# Patient Record
Sex: FEMALE | Race: WHITE | NOT HISPANIC OR LATINO | Employment: OTHER | URBAN - METROPOLITAN AREA
[De-identification: names, ages, dates, MRNs, and addresses within clinical notes are randomized per-mention and may not be internally consistent; named-entity substitution may affect disease eponyms.]

---

## 2017-07-12 ENCOUNTER — HOSPITAL ENCOUNTER (OUTPATIENT)
Dept: MAMMOGRAPHY | Facility: CLINIC | Age: 62
Discharge: HOME | End: 2017-07-12
Attending: FAMILY MEDICINE | Admitting: FAMILY MEDICINE
Payer: COMMERCIAL

## 2017-07-12 DIAGNOSIS — Z12.31 VISIT FOR SCREENING MAMMOGRAM: ICD-10-CM

## 2017-07-12 PROCEDURE — 77063 BREAST TOMOSYNTHESIS BI: CPT

## 2017-10-11 RX ORDER — LIDOCAINE 40 MG/G
CREAM TOPICAL
Status: CANCELLED | OUTPATIENT
Start: 2017-10-11

## 2017-10-16 ENCOUNTER — HOSPITAL ENCOUNTER (OUTPATIENT)
Facility: CLINIC | Age: 62
Discharge: HOME | End: 2017-10-16
Attending: COLON & RECTAL SURGERY | Admitting: COLON & RECTAL SURGERY
Payer: COMMERCIAL

## 2017-10-16 ENCOUNTER — SURGERY (OUTPATIENT)
Age: 62
End: 2017-10-16

## 2017-10-16 VITALS
DIASTOLIC BLOOD PRESSURE: 60 MMHG | BODY MASS INDEX: 18.27 KG/M2 | RESPIRATION RATE: 16 BRPM | WEIGHT: 107 LBS | HEIGHT: 64 IN | SYSTOLIC BLOOD PRESSURE: 93 MMHG | OXYGEN SATURATION: 98 %

## 2017-10-16 LAB — COLONOSCOPY: NORMAL

## 2017-10-16 PROCEDURE — 88305 TISSUE EXAM BY PATHOLOGIST: CPT | Mod: 26 | Performed by: COLON & RECTAL SURGERY

## 2017-10-16 PROCEDURE — 88305 TISSUE EXAM BY PATHOLOGIST: CPT | Performed by: COLON & RECTAL SURGERY

## 2017-10-16 PROCEDURE — 25000128 H RX IP 250 OP 636: Performed by: COLON & RECTAL SURGERY

## 2017-10-16 PROCEDURE — G0500 MOD SEDAT ENDO SERVICE >5YRS: HCPCS | Performed by: COLON & RECTAL SURGERY

## 2017-10-16 PROCEDURE — 99153 MOD SED SAME PHYS/QHP EA: CPT

## 2017-10-16 PROCEDURE — 45380 COLONOSCOPY AND BIOPSY: CPT | Performed by: COLON & RECTAL SURGERY

## 2017-10-16 RX ORDER — ONDANSETRON 2 MG/ML
4 INJECTION INTRAMUSCULAR; INTRAVENOUS
Status: DISCONTINUED | OUTPATIENT
Start: 2017-10-16 | End: 2017-10-16 | Stop reason: HOSPADM

## 2017-10-16 RX ORDER — FLUMAZENIL 0.1 MG/ML
0.2 INJECTION, SOLUTION INTRAVENOUS
Status: DISCONTINUED | OUTPATIENT
Start: 2017-10-16 | End: 2017-10-16 | Stop reason: HOSPADM

## 2017-10-16 RX ORDER — ONDANSETRON 2 MG/ML
4 INJECTION INTRAMUSCULAR; INTRAVENOUS EVERY 6 HOURS PRN
Status: DISCONTINUED | OUTPATIENT
Start: 2017-10-16 | End: 2017-10-16 | Stop reason: HOSPADM

## 2017-10-16 RX ORDER — NALOXONE HYDROCHLORIDE 0.4 MG/ML
.1-.4 INJECTION, SOLUTION INTRAMUSCULAR; INTRAVENOUS; SUBCUTANEOUS
Status: DISCONTINUED | OUTPATIENT
Start: 2017-10-16 | End: 2017-10-16 | Stop reason: HOSPADM

## 2017-10-16 RX ORDER — ONDANSETRON 4 MG/1
4 TABLET, ORALLY DISINTEGRATING ORAL EVERY 6 HOURS PRN
Status: DISCONTINUED | OUTPATIENT
Start: 2017-10-16 | End: 2017-10-16 | Stop reason: HOSPADM

## 2017-10-16 RX ORDER — FENTANYL CITRATE 50 UG/ML
INJECTION, SOLUTION INTRAMUSCULAR; INTRAVENOUS PRN
Status: DISCONTINUED | OUTPATIENT
Start: 2017-10-16 | End: 2017-10-16 | Stop reason: HOSPADM

## 2017-10-16 RX ADMIN — FENTANYL CITRATE 100 MCG: 50 INJECTION, SOLUTION INTRAMUSCULAR; INTRAVENOUS at 07:46

## 2017-10-16 RX ADMIN — MIDAZOLAM HYDROCHLORIDE 2 MG: 1 INJECTION, SOLUTION INTRAMUSCULAR; INTRAVENOUS at 07:46

## 2017-10-16 NOTE — H&P
"Pre-Endoscopy History and Physical     Destiney Fenton MRN# [id number]   YOB: 1955 Age: 62 year old     Date of Procedure: 10/16/2017  Primary care provider: Soumya Varghese  Type of Endoscopy: Colonoscopy  Reason for Procedure: screening  Type of Anesthesia Anticipated: Moderate Sedation    HPI:    Destiney is a 62 year old female who will be undergoing the above procedure.      A history and physical has been performed. The patient's medications and allergies have been reviewed. The risks and benefits of the procedure and the sedation options and risks were discussed with the patient.  All questions were answered and informed consent was obtained.      She denies a personal or family history of anesthesia complications or bleeding disorders.     Allergies   Allergen Reactions     Amoxicillin      Penicillins      Seafood [Shellfish-Derived Products] Swelling     Hives and facial swelling          No current facility-administered medications on file prior to encounter.   No current outpatient prescriptions on file prior to encounter.    Patient Active Problem List   Diagnosis   (none) - all problems resolved or deleted        No past medical history on file.     Past Surgical History:   Procedure Laterality Date     APPENDECTOMY       ORTHOPEDIC SURGERY      meniscus     UNKNOWN SUBSTANCE ID      rib resection       Social History   Substance Use Topics     Smoking status: Never Smoker     Smokeless tobacco: Never Used     Alcohol use Yes      Comment: 1/week       No family history on file.    REVIEW OF SYSTEMS:     5 point ROS negative except as noted above in HPI, including Gen., Resp., CV, GI &  system review.      PHYSICAL EXAM:   /76  Resp 16  Ht 1.626 m (5' 4\")  Wt 48.5 kg (107 lb)  SpO2 99%  BMI 18.37 kg/m2 Estimated body mass index is 18.37 kg/(m^2) as calculated from the following:    Height as of this encounter: 1.626 m (5' 4\").    Weight as of this encounter: 48.5 kg (107 lb). "   GENERAL APPEARANCE: healthy and alert  MENTAL STATUS: alert  AIRWAY EXAM: Mallampatti Class I (visualization of the soft palate, fauces, uvula, anterior and posterior pillars)  RESP: lungs clear to auscultation - no rales, rhonchi or wheezes  CV: regular rates and rhythm      IMPRESSION   ASA Class 2 - Mild systemic disease        PLAN:     Plan for colonoscopy. We discussed the risks, benefits and alternatives and the patient wished to proceed.    The above has been forwarded to the consulting provider.      Toya Valdez MD  [facility]  Phone: [phone]  Fax: [phone]  October 16, 2017

## 2017-10-17 LAB — COPATH REPORT: NORMAL

## 2018-07-16 ENCOUNTER — HOSPITAL ENCOUNTER (OUTPATIENT)
Dept: MAMMOGRAPHY | Facility: CLINIC | Age: 63
Discharge: HOME | End: 2018-07-16
Attending: FAMILY MEDICINE | Admitting: FAMILY MEDICINE
Payer: COMMERCIAL

## 2018-07-16 DIAGNOSIS — Z12.31 SCREENING MAMMOGRAM, ENCOUNTER FOR: ICD-10-CM

## 2018-07-16 PROCEDURE — 77067 SCR MAMMO BI INCL CAD: CPT

## 2019-07-18 ENCOUNTER — HOSPITAL ENCOUNTER (OUTPATIENT)
Dept: MAMMOGRAPHY | Facility: CLINIC | Age: 64
Discharge: HOME | End: 2019-07-18
Attending: FAMILY MEDICINE | Admitting: FAMILY MEDICINE
Payer: COMMERCIAL

## 2019-07-18 DIAGNOSIS — Z12.31 VISIT FOR SCREENING MAMMOGRAM: ICD-10-CM

## 2019-07-18 PROCEDURE — 77063 BREAST TOMOSYNTHESIS BI: CPT

## 2020-07-22 ENCOUNTER — HOSPITAL ENCOUNTER (OUTPATIENT)
Dept: MAMMOGRAPHY | Facility: CLINIC | Age: 65
Discharge: HOME | End: 2020-07-22
Attending: FAMILY MEDICINE | Admitting: FAMILY MEDICINE
Payer: COMMERCIAL

## 2020-07-22 DIAGNOSIS — Z12.31 VISIT FOR SCREENING MAMMOGRAM: ICD-10-CM

## 2020-07-22 PROCEDURE — 77067 SCR MAMMO BI INCL CAD: CPT

## 2021-03-21 ENCOUNTER — HEALTH MAINTENANCE LETTER (OUTPATIENT)
Age: 66
End: 2021-03-21

## 2021-07-23 ENCOUNTER — HOSPITAL ENCOUNTER (OUTPATIENT)
Dept: MAMMOGRAPHY | Facility: CLINIC | Age: 66
Discharge: HOME | End: 2021-07-23
Attending: FAMILY MEDICINE | Admitting: FAMILY MEDICINE
Payer: COMMERCIAL

## 2021-07-23 DIAGNOSIS — Z12.31 VISIT FOR SCREENING MAMMOGRAM: ICD-10-CM

## 2021-07-23 PROCEDURE — 77063 BREAST TOMOSYNTHESIS BI: CPT

## 2021-09-05 ENCOUNTER — HEALTH MAINTENANCE LETTER (OUTPATIENT)
Age: 66
End: 2021-09-05

## 2022-05-31 ENCOUNTER — NURSE TRIAGE (OUTPATIENT)
Dept: NURSING | Facility: CLINIC | Age: 67
End: 2022-05-31
Payer: COMMERCIAL

## 2022-05-31 NOTE — TELEPHONE ENCOUNTER
Pt calling with concerns about;    [facility] [place] [facility] and found ticks on her today.  One tick was hooked into her back that she didn't find until this evening while showering.  Pt states it was a larger tick.   Spot on Pt's back looks infected already, per Pt. Big round red spot.    Pt wants to have this checked and wondering if she should go in tonight.    Advised that she can wait until tomorrow and reviewed home care advise.  Pt verbalized understanding and agrees with this plan of care.    Rylie Wahl RN, Nurse Advisor 6:09 PM 5/31/2022  COVID 19 Nurse Triage Plan/Patient Instructions    Please be aware that novel coronavirus (COVID-19) may be circulating in the community. If you develop symptoms such as fever, cough, or SOB or if you have concerns about the presence of another infection including coronavirus (COVID-19), please contact your health care provider or visit [email or web address].     Disposition/Instructions    Home care recommended. Follow home care protocol based instructions.    Thank you for taking steps to prevent the spread of this virus.  o Limit your contact with others.  o Wear a simple mask to cover your cough.  o Wash your hands well and often.    Resources    [facility]: About COVID-19: [email or web address]    CDC: What to Do If You're Sick: www.cdc.gov/coronavirus/2019-ncov/about/steps-when-sick.html    CDC: Ending Home Isolation: www.cdc.gov/coronavirus/2019-ncov/hcp/disposition-in-home-patients.html     CDC: Caring for Someone: www.cdc.gov/coronavirus/2019-ncov/if-you-are-sick/care-for-someone.html     [facility]: Interim Guidance for Hospital Discharge to Home: www.health.[facility].[place]/diseases/coronavirus/hcp/hospdischarge.pdf    [facility] clinical trials (COVID-19 research studies): [email or web address]     Below are the COVID-19 hotlines at the [place] Department of [facility] ([facility]). Interpreters  are available.   o For health questions: Call 704-760-3428 or 1-768.414.1456 (7 a.m. to 7 p.m.)  o For questions about schools and childcare: Call 054-525-0483 or 1-772.340.2421 (7 a.m. to 7 p.m.)   Reason for Disposition    Tick bite with no complications    Additional Information    Negative: Sounds like a life-threatening emergency to the triager    Negative: Not a tick bite    Negative: [1] 2 to 14 days following tick bite AND [2] severe headache with fever occurs    Negative: [1] 2 to 14 days following tick bite AND [2] widespread rash with fever occurs    Negative: Patient sounds very sick or weak to the triager    Negative: [1] Fever AND [2] red area    Negative: [1] Fever AND [2] area is very tender to touch    Negative: [1] Red streak or red line AND [2] length > 2 inches (5 cm)    Negative: Can't remove live tick (after trying Care Advice)    Negative: Can't remove tick's head that was broken off in the skin (after trying Care Advice)    Negative: [1] 2 to 14 days following tick bite AND [2] fever AND [3] no rash or headache    Negative: [1] 2 to 14 days following tick bite AND [2] widespread rash or headache AND [3] no fever    Negative: [1] Red or very tender (to touch) area AND [2] started over 24 hours after the bite    Negative: Red ring or bull's-eye rash occurs at tick bite    Negative: [1] Probable deer tick AND [2] attached > 36 hours (or tick appears swollen, not flat) AND [3] occurred in an area where Lyme disease is common    Negative: [1] Scab is present AND [2] it drains pus or increases in size AND [3] not improved after applying antibiotic ointment for 2 days    Negative: [1] Scab is present AND [2] it drains pus or increases in size    Protocols used: TICK BITE-A-

## 2022-06-12 ENCOUNTER — HEALTH MAINTENANCE LETTER (OUTPATIENT)
Age: 67
End: 2022-06-12

## 2022-07-27 ENCOUNTER — HOSPITAL ENCOUNTER (OUTPATIENT)
Dept: MAMMOGRAPHY | Facility: CLINIC | Age: 67
Discharge: HOME | End: 2022-07-27
Attending: FAMILY MEDICINE | Admitting: FAMILY MEDICINE
Payer: MEDICARE

## 2022-07-27 DIAGNOSIS — Z12.31 VISIT FOR SCREENING MAMMOGRAM: ICD-10-CM

## 2022-07-27 PROCEDURE — 77067 SCR MAMMO BI INCL CAD: CPT

## 2022-10-22 ENCOUNTER — HEALTH MAINTENANCE LETTER (OUTPATIENT)
Age: 67
End: 2022-10-22

## 2022-10-28 ENCOUNTER — HOSPITAL ENCOUNTER (OUTPATIENT)
Facility: CLINIC | Age: 67
Discharge: HOME | End: 2022-10-28
Attending: COLON & RECTAL SURGERY | Admitting: COLON & RECTAL SURGERY
Payer: MEDICARE

## 2022-10-28 VITALS
DIASTOLIC BLOOD PRESSURE: 71 MMHG | OXYGEN SATURATION: 100 % | WEIGHT: 107 LBS | RESPIRATION RATE: 10 BRPM | BODY MASS INDEX: 18.27 KG/M2 | HEIGHT: 64 IN | SYSTOLIC BLOOD PRESSURE: 104 MMHG | HEART RATE: 51 BPM

## 2022-10-28 LAB — COLONOSCOPY: NORMAL

## 2022-10-28 PROCEDURE — G0500 MOD SEDAT ENDO SERVICE >5YRS: HCPCS | Performed by: COLON & RECTAL SURGERY

## 2022-10-28 PROCEDURE — G0105 COLORECTAL SCRN; HI RISK IND: HCPCS | Performed by: COLON & RECTAL SURGERY

## 2022-10-28 PROCEDURE — 45378 DIAGNOSTIC COLONOSCOPY: CPT | Performed by: COLON & RECTAL SURGERY

## 2022-10-28 PROCEDURE — 99153 MOD SED SAME PHYS/QHP EA: CPT | Performed by: COLON & RECTAL SURGERY

## 2022-10-28 PROCEDURE — 250N000011 HC RX IP 250 OP 636: Performed by: COLON & RECTAL SURGERY

## 2022-10-28 RX ORDER — NALOXONE HYDROCHLORIDE 0.4 MG/ML
0.2 INJECTION, SOLUTION INTRAMUSCULAR; INTRAVENOUS; SUBCUTANEOUS
Status: DISCONTINUED | OUTPATIENT
Start: 2022-10-28 | End: 2022-10-28 | Stop reason: HOSPADM

## 2022-10-28 RX ORDER — ONDANSETRON 4 MG/1
4 TABLET, ORALLY DISINTEGRATING ORAL EVERY 6 HOURS PRN
Status: DISCONTINUED | OUTPATIENT
Start: 2022-10-28 | End: 2022-10-28 | Stop reason: HOSPADM

## 2022-10-28 RX ORDER — PROCHLORPERAZINE MALEATE 5 MG
5 TABLET ORAL EVERY 6 HOURS PRN
Status: DISCONTINUED | OUTPATIENT
Start: 2022-10-28 | End: 2022-10-28 | Stop reason: HOSPADM

## 2022-10-28 RX ORDER — FENTANYL CITRATE 50 UG/ML
INJECTION, SOLUTION INTRAMUSCULAR; INTRAVENOUS PRN
Status: DISCONTINUED | OUTPATIENT
Start: 2022-10-28 | End: 2022-10-28 | Stop reason: HOSPADM

## 2022-10-28 RX ORDER — ONDANSETRON 2 MG/ML
4 INJECTION INTRAMUSCULAR; INTRAVENOUS
Status: DISCONTINUED | OUTPATIENT
Start: 2022-10-28 | End: 2022-10-28 | Stop reason: HOSPADM

## 2022-10-28 RX ORDER — LIDOCAINE 40 MG/G
CREAM TOPICAL
Status: DISCONTINUED | OUTPATIENT
Start: 2022-10-28 | End: 2022-10-28 | Stop reason: HOSPADM

## 2022-10-28 RX ORDER — FLUMAZENIL 0.1 MG/ML
0.2 INJECTION, SOLUTION INTRAVENOUS
Status: DISCONTINUED | OUTPATIENT
Start: 2022-10-28 | End: 2022-10-28 | Stop reason: HOSPADM

## 2022-10-28 RX ORDER — DIPHENHYDRAMINE HYDROCHLORIDE 50 MG/ML
INJECTION INTRAMUSCULAR; INTRAVENOUS PRN
Status: DISCONTINUED | OUTPATIENT
Start: 2022-10-28 | End: 2022-10-28 | Stop reason: HOSPADM

## 2022-10-28 RX ORDER — NALOXONE HYDROCHLORIDE 0.4 MG/ML
0.4 INJECTION, SOLUTION INTRAMUSCULAR; INTRAVENOUS; SUBCUTANEOUS
Status: DISCONTINUED | OUTPATIENT
Start: 2022-10-28 | End: 2022-10-28 | Stop reason: HOSPADM

## 2022-10-28 RX ORDER — ONDANSETRON 2 MG/ML
4 INJECTION INTRAMUSCULAR; INTRAVENOUS EVERY 6 HOURS PRN
Status: DISCONTINUED | OUTPATIENT
Start: 2022-10-28 | End: 2022-10-28 | Stop reason: HOSPADM

## 2022-10-28 ASSESSMENT — ACTIVITIES OF DAILY LIVING (ADL)
ADLS_ACUITY_SCORE: 33
ADLS_ACUITY_SCORE: 35

## 2022-10-28 NOTE — H&P
Pre-Endoscopy History and Physical     Destiney Fenton MRN# [id number]   YOB: 1955 Age: 67 year old     Date of Procedure: 10/28/22  Primary care provider: Soumya Varghese  Type of Endoscopy: Colonoscopy  Reason for Procedure: surveillance, TA x 1 in 2017  Type of Anesthesia Anticipated: Moderate Sedation    HPI:    Destiney is a 67 year old female who will be undergoing the above procedure.      A history and physical has been performed. The patient's medications and allergies have been reviewed. The risks and benefits of the procedure and the sedation options and risks were discussed with the patient.  All questions were answered and informed consent was obtained.      She denies a personal or family history of anesthesia complications or bleeding disorders.     Allergies   Allergen Reactions     Amoxicillin      Asparagus Hives     Facial hives     Penicillins      Seafood [Shellfish-Derived Products] Swelling     Hives and facial swelling      Allergy to Latex: NO   Allergy to tape: NO   Intolerances: n/a    No current facility-administered medications on file prior to encounter.  Multiple Vitamins-Minerals (MULTIVITAMIN ADULT PO),         Patient Active Problem List   Diagnosis   (none) - all problems resolved or deleted        History reviewed. No pertinent past medical history.     Past Surgical History:   Procedure Laterality Date     APPENDECTOMY       COLONOSCOPY N/A 10/16/2017    Procedure: COMBINED COLONOSCOPY, SINGLE OR MULTIPLE BIOPSY/POLYPECTOMY BY BIOPSY;;  Surgeon: Toya Valdez MD;  Location:  GI     ORTHOPEDIC SURGERY      meniscus     UNKNOWN SUBSTANCE ID      rib resection       Social History     Tobacco Use     Smoking status: Never     Smokeless tobacco: Never   Substance Use Topics     Alcohol use: Yes     Comment: 1/week       History reviewed. No pertinent family history.    REVIEW OF SYSTEMS:     5 point ROS negative except as noted above in HPI, including Gen., Resp.,  "CV, GI &  system review.      PHYSICAL EXAM:   /84   Pulse 66   Resp 20   Ht 1.626 m (5' 4\")   Wt 48.5 kg (107 lb)   SpO2 99%   BMI 18.37 kg/m   Estimated body mass index is 18.37 kg/m  as calculated from the following:    Height as of this encounter: 1.626 m (5' 4\").    Weight as of this encounter: 48.5 kg (107 lb).   All Vitals have been reviewed.    GENERAL APPEARANCE: healthy and alert  MENTAL STATUS: alert  HEENT: NC/AT, normal neck mobility   RESP: lungs clear to auscultation - no rales, rhonchi or wheezes  CV: regular rates and rhythm      IMPRESSION   ASA Class 2 - Mild systemic disease        PLAN:     Plan for colonoscopy. We discussed the risks, benefits and alternatives and the patient wished to proceed.    The above has been forwarded to the consulting provider.      Mary Romero MD  [facility]  Phone: [phone]  Fax: [phone]  October 28, 2022      "

## 2023-06-01 ENCOUNTER — HEALTH MAINTENANCE LETTER (OUTPATIENT)
Age: 68
End: 2023-06-01

## 2023-07-28 ENCOUNTER — HOSPITAL ENCOUNTER (OUTPATIENT)
Dept: MAMMOGRAPHY | Facility: CLINIC | Age: 68
Discharge: HOME | End: 2023-07-28
Attending: FAMILY MEDICINE | Admitting: FAMILY MEDICINE
Payer: MEDICARE

## 2023-07-28 DIAGNOSIS — Z12.31 ENCOUNTER FOR SCREENING MAMMOGRAM FOR MALIGNANT NEOPLASM OF BREAST: ICD-10-CM

## 2023-07-28 PROCEDURE — 77067 SCR MAMMO BI INCL CAD: CPT

## 2024-06-02 ENCOUNTER — HEALTH MAINTENANCE LETTER (OUTPATIENT)
Age: 69
End: 2024-06-02

## 2024-07-31 ENCOUNTER — HOSPITAL ENCOUNTER (OUTPATIENT)
Dept: MAMMOGRAPHY | Facility: CLINIC | Age: 69
Discharge: HOME | End: 2024-07-31
Attending: FAMILY MEDICINE | Admitting: FAMILY MEDICINE
Payer: MEDICARE

## 2024-07-31 DIAGNOSIS — Z12.31 VISIT FOR SCREENING MAMMOGRAM: ICD-10-CM

## 2024-07-31 PROCEDURE — 77063 BREAST TOMOSYNTHESIS BI: CPT

## 2025-06-15 ENCOUNTER — HEALTH MAINTENANCE LETTER (OUTPATIENT)
Age: 70
End: 2025-06-15

## 2025-08-13 ENCOUNTER — HOSPITAL ENCOUNTER (OUTPATIENT)
Dept: MAMMOGRAPHY | Facility: CLINIC | Age: 70
Discharge: HOME | End: 2025-08-13
Attending: FAMILY MEDICINE
Payer: MEDICARE

## 2025-08-13 DIAGNOSIS — Z12.31 VISIT FOR SCREENING MAMMOGRAM: ICD-10-CM

## 2025-08-13 PROCEDURE — 77063 BREAST TOMOSYNTHESIS BI: CPT

## (undated) RX ORDER — FENTANYL CITRATE 50 UG/ML
INJECTION, SOLUTION INTRAMUSCULAR; INTRAVENOUS
Status: DISPENSED
Start: 2022-10-28

## (undated) RX ORDER — FENTANYL CITRATE 50 UG/ML
INJECTION, SOLUTION INTRAMUSCULAR; INTRAVENOUS
Status: DISPENSED
Start: 2017-10-16

## (undated) RX ORDER — DIPHENHYDRAMINE HYDROCHLORIDE 50 MG/ML
INJECTION INTRAMUSCULAR; INTRAVENOUS
Status: DISPENSED
Start: 2022-10-28